# Patient Record
Sex: FEMALE | Race: WHITE | NOT HISPANIC OR LATINO
[De-identification: names, ages, dates, MRNs, and addresses within clinical notes are randomized per-mention and may not be internally consistent; named-entity substitution may affect disease eponyms.]

---

## 2017-01-13 ENCOUNTER — RX RENEWAL (OUTPATIENT)
Age: 42
End: 2017-01-13

## 2017-03-26 ENCOUNTER — RESULT REVIEW (OUTPATIENT)
Age: 42
End: 2017-03-26

## 2017-05-23 ENCOUNTER — APPOINTMENT (OUTPATIENT)
Dept: ENDOCRINOLOGY | Facility: CLINIC | Age: 42
End: 2017-05-23

## 2017-07-25 ENCOUNTER — APPOINTMENT (OUTPATIENT)
Dept: ENDOCRINOLOGY | Facility: CLINIC | Age: 42
End: 2017-07-25

## 2021-02-05 DIAGNOSIS — J45.909 UNSPECIFIED ASTHMA, UNCOMPLICATED: ICD-10-CM

## 2021-02-05 RX ORDER — ALBUTEROL SULFATE 90 UG/1
108 (90 BASE) INHALANT RESPIRATORY (INHALATION)
Refills: 0 | Status: ACTIVE | COMMUNITY
Start: 2021-02-05

## 2021-02-12 ENCOUNTER — APPOINTMENT (OUTPATIENT)
Dept: INTERNAL MEDICINE | Facility: CLINIC | Age: 46
End: 2021-02-12
Payer: COMMERCIAL

## 2021-02-12 VITALS
HEIGHT: 61 IN | SYSTOLIC BLOOD PRESSURE: 130 MMHG | DIASTOLIC BLOOD PRESSURE: 72 MMHG | WEIGHT: 160 LBS | BODY MASS INDEX: 30.21 KG/M2

## 2021-02-12 DIAGNOSIS — H10.10 ACUTE ATOPIC CONJUNCTIVITIS, UNSPECIFIED EYE: ICD-10-CM

## 2021-02-12 PROCEDURE — 99396 PREV VISIT EST AGE 40-64: CPT

## 2021-02-12 PROCEDURE — 99072 ADDL SUPL MATRL&STAF TM PHE: CPT

## 2021-02-12 RX ORDER — OLOPATADINE HYDROCHLORIDE 2 MG/ML
0.2 SOLUTION OPHTHALMIC DAILY
Qty: 1 | Refills: 0 | Status: ACTIVE | COMMUNITY
Start: 2021-02-12 | End: 1900-01-01

## 2021-02-12 NOTE — PHYSICAL EXAM
[No Lymphadenopathy] : no lymphadenopathy [Clear to Auscultation] : lungs were clear to auscultation bilaterally [No Edema] : there was no peripheral edema [No Carotid Bruits] : no carotid bruits [No Joint Swelling] : no joint swelling [No Focal Deficits] : no focal deficits [Normal] : affect was normal and insight and judgment were intact

## 2021-02-12 NOTE — PLAN
[FreeTextEntry1] : Her exam is unremarkable.\par Chronic insomnia–Ambien 10 mg with 5 refills was renewed.\par History of hypothyroidism–she will follow-up with her endocrinologist.\par Possible allergic conjunctivitis–she will try Patanol drops daily.  She has an appointment with the ophthalmologist for 2 weeks for a general checkup and will follow up with him if it is not improved.\par She does yearly GYN/mammo with her doctor in [place]

## 2021-02-12 NOTE — HISTORY OF PRESENT ILLNESS
[de-identified] : 44 y/o female presents for annual wellness visit.\par She has been generally well without any recent illness.\par She sees an endocrinologist in [place] which is where she lives.  Endocrinologist does do blood work on her on a regular basis.  9 recently she also complains of an issue with itchy eyes and is wondering if it could be from allergies.

## 2021-02-12 NOTE — REVIEW OF SYSTEMS
[Fever] : no fever [Chills] : no chills [Redness] : redness [Itching] : itching [Dryness] : dryness  [Chest Pain] : no chest pain [Palpitations] : no palpitations [Shortness Of Breath] : no shortness of breath [Abdominal Pain] : no abdominal pain [Diarrhea] : diarrhea [Joint Swelling] : no joint swelling [Muscle Weakness] : no muscle weakness [Fainting] : no fainting [Insomnia] : insomnia

## 2021-08-19 ENCOUNTER — APPOINTMENT (OUTPATIENT)
Dept: INTERNAL MEDICINE | Facility: CLINIC | Age: 46
End: 2021-08-19
Payer: COMMERCIAL

## 2021-08-19 VITALS
HEIGHT: 61 IN | SYSTOLIC BLOOD PRESSURE: 120 MMHG | WEIGHT: 159 LBS | DIASTOLIC BLOOD PRESSURE: 50 MMHG | BODY MASS INDEX: 30.02 KG/M2

## 2021-08-19 PROCEDURE — 99213 OFFICE O/P EST LOW 20 MIN: CPT

## 2021-08-19 RX ORDER — ZOLPIDEM TARTRATE 10 MG/1
10 TABLET, FILM COATED ORAL
Refills: 0 | Status: DISCONTINUED | COMMUNITY
Start: 2021-02-05 | End: 2021-08-19

## 2021-08-19 NOTE — PHYSICAL EXAM
[No Acute Distress] : no acute distress [No Respiratory Distress] : no respiratory distress  [Normal Rate] : normal rate  [Regular Rhythm] : with a regular rhythm [No Edema] : there was no peripheral edema

## 2021-08-19 NOTE — REVIEW OF SYSTEMS
[Insomnia] : insomnia [Fever] : no fever [Chills] : no chills [Chest Pain] : no chest pain [Palpitations] : no palpitations [Shortness Of Breath] : no shortness of breath [Abdominal Pain] : no abdominal pain [Diarrhea] : diarrhea [Headache] : no headache [Dizziness] : no dizziness [Fainting] : no fainting [Anxiety] : no anxiety [Depression] : no depression

## 2021-08-19 NOTE — HISTORY OF PRESENT ILLNESS
[de-identified] : 45 y/o presents for follow up.  She has a history of chronic insomnia.\par She also has a history of hypothyroidism and a thyroid nodule and sees an endocrinologist on a regular basis.

## 2021-08-19 NOTE — ASSESSMENT
[FreeTextEntry1] : Chronic insomnia–Ambien 10 mg #30 with 5 refills was renewed.  She has done well with this medication in the past without any problems or issues.\par \par Hypothyroidism/thyroid nodule–for now she remains on Synthroid 100 mcg daily and has follow-up scheduled with her endocrinologist.

## 2021-08-19 NOTE — HEALTH RISK ASSESSMENT
[No] : In the past 12 months have you used drugs other than those required for medical reasons? No [0] : 2) Feeling down, depressed, or hopeless: Not at all (0) [PHQ-2 Negative - No further assessment needed] : PHQ-2 Negative - No further assessment needed [] : No [[id number]] : 0

## 2022-02-10 ENCOUNTER — NON-APPOINTMENT (OUTPATIENT)
Age: 47
End: 2022-02-10

## 2022-02-10 ENCOUNTER — APPOINTMENT (OUTPATIENT)
Dept: INTERNAL MEDICINE | Facility: CLINIC | Age: 47
End: 2022-02-10
Payer: COMMERCIAL

## 2022-02-10 VITALS — SYSTOLIC BLOOD PRESSURE: 122 MMHG | DIASTOLIC BLOOD PRESSURE: 70 MMHG

## 2022-02-10 DIAGNOSIS — J30.9 ALLERGIC RHINITIS, UNSPECIFIED: ICD-10-CM

## 2022-02-10 PROCEDURE — 99213 OFFICE O/P EST LOW 20 MIN: CPT

## 2022-02-10 NOTE — HEALTH RISK ASSESSMENT
[Never] : Never [No] : In the past 12 months have you used drugs other than those required for medical reasons? No [0] : 2) Feeling down, depressed, or hopeless: Not at all (0) [PHQ-2 Negative - No further assessment needed] : PHQ-2 Negative - No further assessment needed [[id number]] : 0

## 2022-02-10 NOTE — HISTORY OF PRESENT ILLNESS
[de-identified] : 45 y/o presents for follow up and prescription renewal.\par   She has a past history of hypothyroidism, hyperlipidemia and chronic insomnia.\par Has been generally well without any recent illness.  She follows with endocrinologist on a regular basis and recently had follow-up lab work done.

## 2022-02-10 NOTE — ASSESSMENT
[FreeTextEntry1] : Insomnia–Ambien 10 mg #30+5 refills was sent.  She has used medication in the past without any difficulty or adverse effects.\par \par History of hypothyroidism–she follows with endocrinologist and had recent lab work done that was within normal range and for now she will remain on Synthroid 100 mcg daily.\par \par Hyperlipidemia–her cholesterol was elevated once again.  The idea of starting a statin was discussed.  She states that her endocrinologist wanted to see a cardiologist to have a calcium score test done first.  She is considering doing this.\par \par Allergic rhinitis–various options were discussed and she is going to try OTC antihistamines first.  If she has no relief with that she will try a cortisone-based nasal spray.

## 2022-08-11 ENCOUNTER — APPOINTMENT (OUTPATIENT)
Dept: INTERNAL MEDICINE | Facility: CLINIC | Age: 47
End: 2022-08-11

## 2022-08-11 ENCOUNTER — NON-APPOINTMENT (OUTPATIENT)
Age: 47
End: 2022-08-11

## 2022-08-11 VITALS
SYSTOLIC BLOOD PRESSURE: 138 MMHG | WEIGHT: 153 LBS | HEIGHT: 61 IN | DIASTOLIC BLOOD PRESSURE: 80 MMHG | BODY MASS INDEX: 28.89 KG/M2

## 2022-08-11 PROCEDURE — 99396 PREV VISIT EST AGE 40-64: CPT

## 2022-08-11 NOTE — PHYSICAL EXAM
[No Acute Distress] : no acute distress [No Respiratory Distress] : no respiratory distress  [Normal Rate] : normal rate  [Regular Rhythm] : with a regular rhythm [No Edema] : there was no peripheral edema [Grossly Normal Strength/Tone] : grossly normal strength/tone [No Focal Deficits] : no focal deficits [Alert and Oriented x3] : oriented to person, place, and time

## 2022-08-11 NOTE — REVIEW OF SYSTEMS
[Fever] : no fever [Chest Pain] : no chest pain [Palpitations] : no palpitations [Lower Ext Edema] : no lower extremity edema [Shortness Of Breath] : no shortness of breath [Dyspnea on Exertion] : no dyspnea on exertion [Abdominal Pain] : no abdominal pain [Muscle Weakness] : no muscle weakness [Headache] : no headache [Dizziness] : no dizziness

## 2022-08-11 NOTE — ASSESSMENT
[FreeTextEntry1] : Her exam is on remarkable.\par \par History of hypothyroidism–she continues to follow with endocrinologist in [place].  She does have an appointment scheduled for October and at that point she will also be having complete lab work done.\par \par Hyperlipidemia–she is not presently on any medication.  She has lab work pending for October and she was told if her cholesterol does remain elevated she should consider starting statin therapy.\par \par Chronic insomnia–Ambien 10 mg #30 with 5 refills was sent.  She is used this in the past without any difficulty or side effects.\par \par Follow-up with a gynecologist as well as mammography was discussed.

## 2022-08-11 NOTE — HISTORY OF PRESENT ILLNESS
[de-identified] : 46 y/o female presents for annual wellness exam and prescription renewal.\par She has a history of hypothyroidism, hyperlipidemia as well as chronic insomnia.  She follows regularly with an endocrinologist in [place].\par She has been generally well without any recent illness.

## 2022-08-11 NOTE — HEALTH RISK ASSESSMENT
[Never] : Never [No] : In the past 12 months have you used drugs other than those required for medical reasons? No [0] : 2) Feeling down, depressed, or hopeless: Not at all (0) [PHQ-2 Negative - No further assessment needed] : PHQ-2 Negative - No further assessment needed [[id number]] : 0

## 2023-01-31 ENCOUNTER — APPOINTMENT (OUTPATIENT)
Dept: INTERNAL MEDICINE | Facility: CLINIC | Age: 48
End: 2023-01-31
Payer: COMMERCIAL

## 2023-01-31 ENCOUNTER — NON-APPOINTMENT (OUTPATIENT)
Age: 48
End: 2023-01-31

## 2023-01-31 VITALS
BODY MASS INDEX: 29.45 KG/M2 | HEIGHT: 61 IN | DIASTOLIC BLOOD PRESSURE: 70 MMHG | SYSTOLIC BLOOD PRESSURE: 130 MMHG | WEIGHT: 156 LBS

## 2023-01-31 PROCEDURE — 99396 PREV VISIT EST AGE 40-64: CPT

## 2023-01-31 NOTE — HEALTH RISK ASSESSMENT
[Never] : Never [No] : In the past 12 months have you used drugs other than those required for medical reasons? No [0] : 2) Feeling down, depressed, or hopeless: Not at all (0) [PHQ-2 Negative - No further assessment needed] : PHQ-2 Negative - No further assessment needed [[id number]] : 0

## 2023-01-31 NOTE — ASSESSMENT
[FreeTextEntry1] : Her exam is on remarkable.\par \par History of hypothyroidism–she continues to follow with endocrinologist in [place].  Recent TSH was borderline low and she is lowering her Synthroid by half a tablet per week.\par \par Hyperlipidemia–recent lab work showed a significant provement in her LDL is now 137.  Hold off any medication for the present time.\par \par Chronic insomnia–Ambien 10 mg #30 with 5 refills was sent.  She is used this in the past without any difficulty or side effects.\par \par Follow-up with a gynecologist as well as mammography was discussed.

## 2023-01-31 NOTE — HISTORY OF PRESENT ILLNESS
[de-identified] : 46 y/o female presents for annual wellness exam and prescription renewal.\par She has a history of hypothyroidism, hyperlipidemia as well as chronic insomnia. \par She does follow with an endocrinologist regularly in [place] and had recent lab work done as well.\par Has been generally well otherwise without any recent illness.

## 2023-08-09 ENCOUNTER — APPOINTMENT (OUTPATIENT)
Dept: INTERNAL MEDICINE | Facility: CLINIC | Age: 48
End: 2023-08-09
Payer: COMMERCIAL

## 2023-08-09 VITALS
DIASTOLIC BLOOD PRESSURE: 70 MMHG | SYSTOLIC BLOOD PRESSURE: 116 MMHG | HEIGHT: 61 IN | BODY MASS INDEX: 30.4 KG/M2 | WEIGHT: 161 LBS

## 2023-08-09 PROCEDURE — 99213 OFFICE O/P EST LOW 20 MIN: CPT

## 2023-08-09 NOTE — ASSESSMENT
[FreeTextEntry1] : Chronic insomnia–she feels recently that the Ambien 10 mg has not been as effective.  She seems to wake up during the night and has difficulty falling back asleep. She is going to try Ambien CR 12.5 mg to see if she finds that to be more effective.  If not she was told to call and she can return to the 10 mg dose.

## 2023-08-09 NOTE — HISTORY OF PRESENT ILLNESS
[de-identified] : 46 y/o female presents for follow-up and prescription renewal. She has a history of hypothyroidism, hyperlipidemia as well as chronic insomnia.  She does follow with an endocrinologist regularly in [place] and had recent lab work done as well. Has been generally well otherwise without any recent illness.

## 2024-01-29 ENCOUNTER — APPOINTMENT (OUTPATIENT)
Dept: INTERNAL MEDICINE | Facility: CLINIC | Age: 49
End: 2024-01-29
Payer: COMMERCIAL

## 2024-01-29 VITALS
BODY MASS INDEX: 30.02 KG/M2 | HEIGHT: 61 IN | WEIGHT: 159 LBS | SYSTOLIC BLOOD PRESSURE: 124 MMHG | DIASTOLIC BLOOD PRESSURE: 80 MMHG

## 2024-01-29 DIAGNOSIS — Z00.00 ENCOUNTER FOR GENERAL ADULT MEDICAL EXAMINATION W/OUT ABNORMAL FINDINGS: ICD-10-CM

## 2024-01-29 DIAGNOSIS — F40.243 FEAR OF FLYING: ICD-10-CM

## 2024-01-29 DIAGNOSIS — G47.00 INSOMNIA, UNSPECIFIED: ICD-10-CM

## 2024-01-29 DIAGNOSIS — E78.5 HYPERLIPIDEMIA, UNSPECIFIED: ICD-10-CM

## 2024-01-29 DIAGNOSIS — E03.9 HYPOTHYROIDISM, UNSPECIFIED: ICD-10-CM

## 2024-01-29 PROCEDURE — 99396 PREV VISIT EST AGE 40-64: CPT

## 2024-01-29 RX ORDER — ROSUVASTATIN CALCIUM 10 MG/1
10 TABLET, FILM COATED ORAL
Qty: 30 | Refills: 0 | Status: ACTIVE | COMMUNITY
Start: 2023-11-22

## 2024-01-29 RX ORDER — ROSUVASTATIN CALCIUM 40 MG/1
40 TABLET, FILM COATED ORAL
Qty: 30 | Refills: 0 | Status: DISCONTINUED | COMMUNITY
Start: 2023-09-20

## 2024-01-29 RX ORDER — ALPRAZOLAM 0.25 MG/1
0.25 TABLET ORAL
Qty: 20 | Refills: 0 | Status: ACTIVE | COMMUNITY
Start: 2024-01-29 | End: 1900-01-01

## 2024-01-29 RX ORDER — ZOLPIDEM TARTRATE 12.5 MG/1
12.5 TABLET, EXTENDED RELEASE ORAL
Qty: 30 | Refills: 5 | Status: ACTIVE | COMMUNITY
Start: 2023-08-09 | End: 1900-01-01

## 2024-01-29 RX ORDER — ZOLPIDEM TARTRATE 10 MG/1
10 TABLET ORAL
Qty: 30 | Refills: 5 | Status: DISCONTINUED | COMMUNITY
Start: 2021-02-12 | End: 2024-01-29

## 2024-01-29 NOTE — HEALTH RISK ASSESSMENT
[No] : No [0] : 2) Feeling down, depressed, or hopeless: Not at all (0) [PHQ-2 Negative - No further assessment needed] : PHQ-2 Negative - No further assessment needed [[id number]] : 0 [Never] : Never

## 2024-01-29 NOTE — HISTORY OF PRESENT ILLNESS
[de-identified] : 47 y/o female presents for annual wellness visit as well as prescription renewals.   She has a history of hypothyroidism, hyperlipidemia as well as chronic insomnia.  She does follow with an endocrinologist regularly in [place] and had recent lab work done as well. Recently treated for a melanoma on her left hip that was found to be stage II but the further workup was unremarkable. Also started recently on Crestor 10 mg for a slightly elevated calcium score.

## 2024-01-29 NOTE — ASSESSMENT
[FreeTextEntry1] : Her exam is unremarkable/unchanged.  Hypothyroidism-had recent follow-up lab work with her endocrinologist.  She remains on Synthroid 100 mcg daily.  Hyperlipidemia-she was recently started on Crestor 10 mg daily which she is tolerating well.  Insomnia-she continues to do better with Ambien extended release 12.5 mg and this was renewed today.  Recent melanoma-she follows regularly with a dermatologist.  As well as medical and surgical oncology.  Anxiety with flying-given Xanax 0.25 mg to use as needed.  She has taken this in the past without difficulty.

## 2024-06-10 RX ORDER — ZOLPIDEM TARTRATE 10 MG/1
10 TABLET ORAL
Qty: 30 | Refills: 5 | Status: ACTIVE | COMMUNITY
Start: 2024-06-10 | End: 1900-01-01

## 2024-12-06 ENCOUNTER — APPOINTMENT (OUTPATIENT)
Dept: INTERNAL MEDICINE | Facility: CLINIC | Age: 49
End: 2024-12-06
Payer: COMMERCIAL

## 2024-12-06 VITALS
WEIGHT: 138 LBS | DIASTOLIC BLOOD PRESSURE: 62 MMHG | HEIGHT: 61 IN | SYSTOLIC BLOOD PRESSURE: 112 MMHG | BODY MASS INDEX: 26.06 KG/M2

## 2024-12-06 DIAGNOSIS — G47.00 INSOMNIA, UNSPECIFIED: ICD-10-CM

## 2024-12-06 DIAGNOSIS — C43.9 MALIGNANT MELANOMA OF SKIN, UNSPECIFIED: ICD-10-CM

## 2024-12-06 DIAGNOSIS — E66.09 OTHER OBESITY DUE TO EXCESS CALORIES: ICD-10-CM

## 2024-12-06 DIAGNOSIS — E03.9 HYPOTHYROIDISM, UNSPECIFIED: ICD-10-CM

## 2024-12-06 DIAGNOSIS — E78.5 HYPERLIPIDEMIA, UNSPECIFIED: ICD-10-CM

## 2024-12-06 PROCEDURE — 99214 OFFICE O/P EST MOD 30 MIN: CPT

## 2024-12-06 PROCEDURE — G2211 COMPLEX E/M VISIT ADD ON: CPT | Mod: NC

## 2024-12-06 RX ORDER — TIRZEPATIDE 5 MG/.5ML
5 INJECTION, SOLUTION SUBCUTANEOUS
Refills: 0 | Status: ACTIVE | COMMUNITY
Start: 2024-12-06